# Patient Record
Sex: FEMALE | Race: WHITE | NOT HISPANIC OR LATINO | Employment: OTHER
[De-identification: names, ages, dates, MRNs, and addresses within clinical notes are randomized per-mention and may not be internally consistent; named-entity substitution may affect disease eponyms.]

---

## 2017-07-15 ENCOUNTER — HEALTH MAINTENANCE LETTER (OUTPATIENT)
Age: 62
End: 2017-07-15

## 2019-11-03 ENCOUNTER — HEALTH MAINTENANCE LETTER (OUTPATIENT)
Age: 64
End: 2019-11-03

## 2020-11-16 ENCOUNTER — HEALTH MAINTENANCE LETTER (OUTPATIENT)
Age: 65
End: 2020-11-16

## 2020-12-22 DIAGNOSIS — G47.00 INSOMNIA, UNSPECIFIED: ICD-10-CM

## 2020-12-22 DIAGNOSIS — G43.709 CHRONIC MIGRAINE WITHOUT AURA WITHOUT STATUS MIGRAINOSUS, NOT INTRACTABLE: Primary | ICD-10-CM

## 2020-12-22 RX ORDER — AMITRIPTYLINE HYDROCHLORIDE 10 MG/1
TABLET ORAL
Qty: 270 TABLET | Refills: 0 | Status: CANCELLED | OUTPATIENT
Start: 2020-12-22

## 2020-12-22 RX ORDER — AMITRIPTYLINE HYDROCHLORIDE 10 MG/1
30 TABLET ORAL AT BEDTIME
Qty: 270 TABLET | Refills: 0 | Status: SHIPPED | OUTPATIENT
Start: 2020-12-22 | End: 2021-02-01

## 2020-12-22 NOTE — TELEPHONE ENCOUNTER
Refill request for amitriptyline.  Pt has upcoming appt on 2/1/21.  Medication T'd for review and signature  Montserrat Vaughn LPN on 12/22/2020 at 9:19 AM

## 2021-02-01 ENCOUNTER — VIRTUAL VISIT (OUTPATIENT)
Dept: NEUROLOGY | Facility: CLINIC | Age: 66
End: 2021-02-01
Payer: COMMERCIAL

## 2021-02-01 VITALS — HEIGHT: 64 IN | BODY MASS INDEX: 33.8 KG/M2 | WEIGHT: 198 LBS

## 2021-02-01 DIAGNOSIS — G43.709 CHRONIC MIGRAINE WITHOUT AURA WITHOUT STATUS MIGRAINOSUS, NOT INTRACTABLE: ICD-10-CM

## 2021-02-01 PROCEDURE — 99213 OFFICE O/P EST LOW 20 MIN: CPT | Mod: GT | Performed by: PSYCHIATRY & NEUROLOGY

## 2021-02-01 RX ORDER — AMITRIPTYLINE HYDROCHLORIDE 10 MG/1
30 TABLET ORAL AT BEDTIME
Qty: 270 TABLET | Refills: 3 | Status: SHIPPED | OUTPATIENT
Start: 2021-02-01 | End: 2022-03-17

## 2021-02-01 ASSESSMENT — MIFFLIN-ST. JEOR: SCORE: 1438.12

## 2021-02-01 NOTE — PROGRESS NOTES
"Video follow-up visit requested by patient  Video follow-up visit due to the COVID-19 pandemic  Patient identified  .Patient is being evaluated via a billable video visit. The patient has been notified of following:     \"This video visit will be conducted via a call between you and your physician/provider. We have found that certain health care needs can be provided without the need for an in-person physical exam. This service lets us provide the care you need with a video conversation. If a prescription is necessary we can send it directly to your pharmacy. If lab work is needed we can place an order for that and you can then stop by our lab to have the test done at a later time.    If during the course of the call the physician/provider feels a video visit is not appropriate, you will not be charged for this service.    Physician has received verbal consent for a Video Visit from the patient? YES    Patient would like the video invitation sent by: Email [employer]    Video Visit Details    Type of service: Video Visit    Video Start Time: 12:15 PM    Video End Time (time video stopped): 12:29 PM    Originating Location (pt. Location): Patient's Home    Distant Location (provider location): [facility] [place]     Mode of Communication: Video Conference via [facility]            Chief Complaint   Follow up migraines have been controlled     HPI  Seen 12/4/2019  Video visit 2/1/2021    65-year-old follow-up of her migraine headaches    Primary MD increase the atenolol to 75 mg once per day due to hypertension after our last visit about a year ago  Primary MD is failing the atenolol for the blood pressure    Followed for migraine since 1994 some menstrually induced migraines in the past has had a hysterectomy       Headache control is good  Headaches less than 3 to 4/month  Usually triggered by sleep cycle being off or eating chocolate  Headaches are more in the mild to moderate category than the " severe  We use Tylenol or ibuprofen ice pack to break the headache  Not using Imitrex anymore    Since last seen  No hospitalizations   No surgeries  No major illnesses    Patient is currently retired  Staying busy with home projects          Past headache control  Frequency of headaches couple per month  Duration a day at a time  No photophobia, nausea, rare vomiting  No visual changes  Not using the Imitrex anymore  Uses either ibuprofen or Tylenol but not excessive ibuprofen        She is followed for:    1. Migrainous type headaches followed here since 1994.  2. Menstrually induced migraines in the past, but has had a hysterectomy.    Family history   Mother had a cerebral aneurysm and migraines        Past workup:    1. Normal angiogram in 2006.  2. Triggers for headaches include chocolate and sleep deprivation.  3. Currently on atenolol and amitriptyline as preventative medications.  4. Other triggers can include sleep cycle, missing her caffeine intake.    Discussed good headache hygiene.    Patient in the past used Imitrex as her abortive agent,   Now uses Tylenol alternating with ibuprofen and ice pack    Discussed can use a half of a Unisom to get herself into deeper sleep if she is having a significant breakthrough or is traveling through time zones, rediscussed today.    Patient states that:    1. She has not used any Imitrex over the last year.  2. She is watching her triggers and that has helped.  3. Frequency of headaches is one every seven days, treated with ibuprofen or Tylenol.  4. Has one coffee and one diet pop per day for her caffeine intake.            Exam    Review of systems  Headaches as above 3 to 4/month mild to moderate controlled    No diplopia dysarthria dysphagia  No focal weakness numbness or tingling  No significant vertigo or ataxia  No tremors    No chest pain shortness of breath nausea vomiting diarrhea fever chills    Exam  Alert oriented x3  Neck supple  HEENT normal except  small birthmark on her left sclera followed by ophthalmology  Moving air well  No edema the feet    Neurologic exam  Alert oriented x3  No aphasia  No neglect  Normal memory recall    Cranials 2 through 12  No hemiplegia  No nystagmus  Visual fields intact  Face symmetrical  Tongue twisters good    Upper extremities  No drift no tremor normal finger-nose    Lower semistrength normal    Gait normal          Assessment/Plan     1.  Migraine (G43.709)     amitriptyline 10 mg tablet 3 at nighttime    Atenolol 75 mg daily filled by primary      If still having trouble with blood pressure could switch to either metoprolol or a lisinopril/hydrochlorothiazide combination     Discussed the difference between abortive and preventive medications    discussed the side effects of amitriptyline and her age group    Follow-up at least on a yearly basis sooner if there is problems        Currently, she is doing the followin. Amitriptyline 10 mg tablet, three tablets at nighttime.  2. Atenolol 75 mg once per day.      Total care time today 22 minutes

## 2021-02-01 NOTE — LETTER
"    2/1/2021         RE: Mónica Noguera  [address]  [place] [address]        Dear Colleague,    Thank you for referring your patient, Mónica Noguera, to the [facility] [place]. Please see a copy of my visit note below.    Video follow-up visit requested by patient  Video follow-up visit due to the COVID-19 pandemic  Patient identified  .Patient is being evaluated via a billable video visit. The patient has been notified of following:     \"This video visit will be conducted via a call between you and your physician/provider. We have found that certain health care needs can be provided without the need for an in-person physical exam. This service lets us provide the care you need with a video conversation. If a prescription is necessary we can send it directly to your pharmacy. If lab work is needed we can place an order for that and you can then stop by our lab to have the test done at a later time.    If during the course of the call the physician/provider feels a video visit is not appropriate, you will not be charged for this service.    Physician has received verbal consent for a Video Visit from the patient? YES    Patient would like the video invitation sent by: [email or web address]    Video Visit Details    Type of service: Video Visit    Video Start Time: 12:15 PM    Video End Time (time video stopped): 12:29 PM    Originating Location (pt. Location): Patient's Home    Distant Location (provider location): [facility] [place]     Mode of Communication: Video Conference via [facility]            Chief Complaint   Follow up migraines have been controlled     HPI  Seen 12/4/2019  Video visit 2/1/2021    65-year-old follow-up of her migraine headaches    Primary MD increase the atenolol to 75 mg once per day due to hypertension after our last visit about a year ago  Primary MD is failing the atenolol for the blood pressure    Followed for migraine since 1994 " some menstrually induced migraines in the past has had a hysterectomy       Headache control is good  Headaches less than 3 to 4/month  Usually triggered by sleep cycle being off or eating chocolate  Headaches are more in the mild to moderate category than the severe  We use Tylenol or ibuprofen ice pack to break the headache  Not using Imitrex anymore    Since last seen  No hospitalizations   No surgeries  No major illnesses    Patient is currently retired  Staying busy with home projects          Past headache control  Frequency of headaches couple per month  Duration a day at a time  No photophobia, nausea, rare vomiting  No visual changes  Not using the Imitrex anymore  Uses either ibuprofen or Tylenol but not excessive ibuprofen        She is followed for:    1. Migrainous type headaches followed here since 1994.  2. Menstrually induced migraines in the past, but has had a hysterectomy.    Family history   Mother had a cerebral aneurysm and migraines        Past workup:    1. Normal angiogram in 2006.  2. Triggers for headaches include chocolate and sleep deprivation.  3. Currently on atenolol and amitriptyline as preventative medications.  4. Other triggers can include sleep cycle, missing her caffeine intake.    Discussed good headache hygiene.    Patient in the past used Imitrex as her abortive agent,   Now uses Tylenol alternating with ibuprofen and ice pack    Discussed can use a half of a Unisom to get herself into deeper sleep if she is having a significant breakthrough or is traveling through time zones, rediscussed today.    Patient states that:    1. She has not used any Imitrex over the last year.  2. She is watching her triggers and that has helped.  3. Frequency of headaches is one every seven days, treated with ibuprofen or Tylenol.  4. Has one coffee and one diet pop per day for her caffeine intake.            Exam    Review of systems  Headaches as above 3 to 4/month mild to moderate  controlled    No diplopia dysarthria dysphagia  No focal weakness numbness or tingling  No significant vertigo or ataxia  No tremors    No chest pain shortness of breath nausea vomiting diarrhea fever chills    Exam  Alert oriented x3  Neck supple  HEENT normal except small birthmark on her left sclera followed by ophthalmology  Moving air well  No edema the feet    Neurologic exam  Alert oriented x3  No aphasia  No neglect  Normal memory recall    Cranials 2 through 12  No hemiplegia  No nystagmus  Visual fields intact  Face symmetrical  Tongue twisters good    Upper extremities  No drift no tremor normal finger-nose    Lower semistrength normal    Gait normal          Assessment/Plan     1.  Migraine (G43.709)     amitriptyline 10 mg tablet 3 at nighttime    Atenolol 75 mg daily filled by primary      If still having trouble with blood pressure could switch to either metoprolol or a lisinopril/hydrochlorothiazide combination     Discussed the difference between abortive and preventive medications    discussed the side effects of amitriptyline and her age group    Follow-up at least on a yearly basis sooner if there is problems        Currently, she is doing the followin. Amitriptyline 10 mg tablet, three tablets at nighttime.  2. Atenolol 75 mg once per day.      Total care time today 22 minutes          Again, thank you for allowing me to participate in the care of your patient.        Sincerely,        Brant Juares MD

## 2021-02-01 NOTE — NURSING NOTE
Chief Complaint   Patient presents with     Annual Visit     For migraines. Pt states she is doing really well. States having less than a handful of migraines per month.     Video Visit     [employer]. If difficulties call [phone].     Montserrat Vaughn LPN on 2/1/2021 at 12:00 PM

## 2021-03-29 ENCOUNTER — TELEPHONE (OUTPATIENT)
Dept: NEUROLOGY | Facility: CLINIC | Age: 66
End: 2021-03-29

## 2021-03-29 NOTE — TELEPHONE ENCOUNTER
Pt called requesting Amitriptyline refill be sent to [facility] in [place]. [phone]

## 2021-03-29 NOTE — TELEPHONE ENCOUNTER
Rx was sent on 2/1/21, quantity 270, with 3 refills.  Spoke to pharmacy, they did receive that prescription and will refill it for pt.  Pt notified refill will be ready at the pharmacy.    Montserrat Vaughn LPN on 3/29/2021 at 3:35 PM

## 2021-06-24 ENCOUNTER — RECORDS - HEALTHEAST (OUTPATIENT)
Dept: ADMINISTRATIVE | Facility: CLINIC | Age: 66
End: 2021-06-24

## 2021-09-18 ENCOUNTER — HEALTH MAINTENANCE LETTER (OUTPATIENT)
Age: 66
End: 2021-09-18

## 2022-01-08 ENCOUNTER — HEALTH MAINTENANCE LETTER (OUTPATIENT)
Age: 67
End: 2022-01-08

## 2022-03-17 ENCOUNTER — TELEPHONE (OUTPATIENT)
Dept: NEUROLOGY | Facility: CLINIC | Age: 67
End: 2022-03-17
Payer: COMMERCIAL

## 2022-03-17 DIAGNOSIS — G43.709 CHRONIC MIGRAINE WITHOUT AURA WITHOUT STATUS MIGRAINOSUS, NOT INTRACTABLE: ICD-10-CM

## 2022-03-17 RX ORDER — AMITRIPTYLINE HYDROCHLORIDE 10 MG/1
30 TABLET ORAL AT BEDTIME
Qty: 90 TABLET | Refills: 0 | Status: SHIPPED | OUTPATIENT
Start: 2022-03-17 | End: 2022-05-18

## 2022-03-17 NOTE — LETTER
3/17/2022        RE: Mónica TRINH Poornima  [address] [place] [address]          Dear Ms. Noguera,       We recently provided you with medication refills.  Many medications require routine follow-up with your doctor.    Your prescription(s) have been refilled for 30 days so you may have time for the above noted follow-up. Please call to schedule soon so we can assure you have an appointment before your next refills are needed. If you have already made a follow up appointment, please disregard this letter.         Sincerely,      [facility] [place]     (Formerly known as [facility])

## 2022-03-17 NOTE — TELEPHONE ENCOUNTER
Refill request for Amitriptyline. Pt last seen 2/1/22 and is in need of follow up. Will send in 30 day supply with zero refills. Will also send letter regarding need for follow up.     Tara Parker RN on 3/17/2022 at 2:42 PM

## 2022-03-17 NOTE — TELEPHONE ENCOUNTER
Carlos Nieto sent a refill request for Amitriptyline 10mg 3 tabs at bedtime #270.  I have lm for pt to call and make a appt.

## 2022-04-30 ENCOUNTER — HEALTH MAINTENANCE LETTER (OUTPATIENT)
Age: 67
End: 2022-04-30

## 2022-05-18 DIAGNOSIS — G43.709 CHRONIC MIGRAINE WITHOUT AURA WITHOUT STATUS MIGRAINOSUS, NOT INTRACTABLE: ICD-10-CM

## 2022-05-18 RX ORDER — AMITRIPTYLINE HYDROCHLORIDE 10 MG/1
30 TABLET ORAL AT BEDTIME
Qty: 270 TABLET | Refills: 2 | Status: SHIPPED | OUTPATIENT
Start: 2022-05-18 | End: 2022-08-16

## 2022-05-18 NOTE — TELEPHONE ENCOUNTER
Refill request for Amitripyline. Pt last seen 2/1/21 and has follow up scheduled for 12/2/22. Will send in refills.     Tara Parker RN on 5/18/2022 at 1:11 PM

## 2022-08-16 ENCOUNTER — VIRTUAL VISIT (OUTPATIENT)
Dept: NEUROLOGY | Facility: CLINIC | Age: 67
End: 2022-08-16
Payer: COMMERCIAL

## 2022-08-16 VITALS — BODY MASS INDEX: 33.8 KG/M2 | WEIGHT: 198 LBS | HEIGHT: 64 IN

## 2022-08-16 DIAGNOSIS — G43.709 CHRONIC MIGRAINE WITHOUT AURA WITHOUT STATUS MIGRAINOSUS, NOT INTRACTABLE: ICD-10-CM

## 2022-08-16 PROCEDURE — 99215 OFFICE O/P EST HI 40 MIN: CPT | Mod: 95

## 2022-08-16 RX ORDER — AMITRIPTYLINE HYDROCHLORIDE 10 MG/1
30 TABLET ORAL AT BEDTIME
Qty: 270 TABLET | Refills: 3 | Status: SHIPPED | OUTPATIENT
Start: 2022-08-16 | End: 2023-10-17

## 2022-08-16 NOTE — PATIENT INSTRUCTIONS
Plan:  --- Continue preventive therapy: amitriptyline (ELAVIL) 10 MG tablet - Take 3 tablets (30 mg) by mouth at Bedtime - refilled  --- Continue abortive therapy: Tylenol or Advil  --- Contiue nonpharmacological interventions: cold, massage, counterpressure and/or rest  --- Practice good headache hygiene: Avoid known triggers, get adequate sleep, manage stress levels, stay hydrated and eat nutritious meals  --- Plan on follow up in the Neurology Clinic in 12 months.  --- Please feel free to reach out if you have any further questions or concerns.  --- Seek immediate medical attention if an emergency arises or if your health becomes progressively worse.     It was a pleasure meeting you today!

## 2022-08-16 NOTE — NURSING NOTE
Chief Complaint   Patient presents with     Migraine     She has a couple of migraines per month.     Video Visit     BURAK Vaughn LPN on 8/16/2022 at 8:50 AM

## 2022-08-16 NOTE — LETTER
8/16/2022         RE: Mónica Noguera  [address] [place] [facility] [address]        Dear Colleague,    Thank you for referring your patient, Mónica Noguera, to the [facility] [place]. Please see a copy of my visit note below.    Mónica is a 67 year old who is being evaluated via a billable video visit.      Video-Visit Details    Video Start Time: 9:01 AM    Type of service:  Video Visit    Video End Time:9:10 AM    Originating Location (pt. Location): Home    Distant Location (provider location):  [facility]UROLOG[facility]     Platform used for Video Visit: [facility]    ESTABLISHED PATIENT NEUROLOGY NOTE    DATE OF VISIT: 8/16/2022  MRN: [id number]  PATIENT NAME: Mónica Noguera  YOB: 1955    Chief Complaint   Patient presents with     Migraine     She has a couple of migraines per month.     Video Visit     MYCHART     SUBJECTIVE:                                                      HISTORY OF PRESENT ILLNESS:  Mónica is here for follow up regarding Migrainous type headaches     Mónica Noguera is a 67-year-old female with a history hypertension and migraines who has been followed since 1994. She was last followed by Dr. Juares for migraine management in the clinic. Per chart review, she has a history of some menstrually induced migraines and has had a hysterectomy.  Past headache were described as a couple per month for the duration of one day.  No photophobia, nausea and rare vomiting.  No visual changes.  Triggers include chocolate and sleep deprivation.  Patient had adequate headache control on amitriptyline as a preventative.  She is also on atenolol for hypertension.  She has used Imitrex in the past as her abortive agent and switched over to Tylenol alternating with ibuprofen and ice pack.     Video Visit Today 08/16/22:  Mónica is a pleasant 67-year-old woman who presented today via a video visit for her annual migraine follow-up.  She describes her  headaches as throbbing pain in the back of her head.  Post hysterectomy and with her current regimen of amitriptyline and atenolol she no longer experiences photophobia, phonophobia or nausea.  She states that she has not needed to lay down to rest for a headache in a very long time.  Triggers include certain foods and disruptions in her sleep cycle.  She reports a couple of manageable migraines per month that are alleviated with Tylenol or Advil.  Other methods to relieve pain include pressure to her temple or neck and ice.  She reports that for the most part she is headache free each month. She denies any visual or speech changes, weakness, neck rigidity, or changes in the pattern of her headache.    She states that she was started on atenolol by neurology years ago for migraine management, and once she was diagnosed with hypertension her PCP kept her on this medication and adjusted the dose.  She follows up with her primary a few times a year to monitor her blood pressure.  Currently well controlled on 75 mg of atenolol daily.     Current Medications:   ATENOLOL 50 MG OR TABS, 1 TABLET DAILY  IMITREX 50 MG OR TABS, prn  AMITRIPTYLINE HCL 10 MG OR TABS, 3 tabs qHS  PREVACID 30 MG OR CPDR, ONE DAILY (Patient not taking: No sig reported)    No current facility-administered medications on file prior to visit.    Past Medical History:   Patient  has a past medical history of Migraine, unspecified, without mention of intractable migraine without mention of status migrainosus and Reflux esophagitis.    Surgical History:  She  has a past surgical history that includes NONSPECIFIC PROCEDURE (1989); VAG HYST,RMV TUBE/OVARY; IR Carotid Angiogram (2/22/2006); IR Miscellaneous Procedure (2/22/2006); IR Carotid Angiogram (2/22/2006); and IR Miscellaneous Procedure (2/22/2006).    Family and Social History:  Reviewed, and she  reports that she has quit smoking. Her smoking use included cigarettes. She has a 20.00 pack-year  smoking history. She has never used smokeless tobacco. She reports that she does not drink alcohol and does not use drugs.    Reviewed, and family history includes Breast Cancer in her mother; Cerebrovascular Disease in her mother; Coronary Artery Disease in her father; Diabetes in her father; Hypertension in her sister; Ovarian Cancer in her mother.      RECENT DIAGNOSTIC STUDIES:   Per chart review,   1. Normal angiogram in 2006.     REVIEW OF SYSTEMS:                                                      10-point review of systems is negative except as mentioned above in HPI.    EXAM:                                                      Physical Exam: Limited exam- Video visit  Vitals: No vitals were obtained today due to virtual visit  GENERAL: NAD. Well groomed  HEENT: NC/AT.  PULM: Non-labored breathing.   Neurologic:  MENTAL STATUS: Alert, attentive. Speech is fluent. Normal comprehension. Normal concentration. Adequate fund of knowledge.   CRANIAL NERVES: Cranial nerves grossly intact: Facial movement normal. Hearing intact to conversation.   SKIN: Visible skin clear. No significant rash, abnormal pigmentation or lesions.  STATION AND GAIT: Good postural reflexes. Casual gait normal     ASSESSMENT and PLAN:                                                      Assessment:    ICD-10-CM    1. Chronic migraine without aura without status migrainosus, not intractable  G43.709 amitriptyline (ELAVIL) 10 MG tablet     Mónica Noguera is a pleasant 67-year-old female with a history hypertension and migraines. She has had adequate headache control on her current preventive therapy of amitriptyline 30 mg at HS and abortive therapy of tylenol or advil.  She states that she has a couple of migraines per month that are manageable with OTC meds, otherwise she remains headache free.  I will plan to keep her current regimen.  I refilled her amitriptyline and plan to see her back next year.     We discussed the interventions and  Mónica understands and agrees with this plan.     Plan:  --- Continue preventive therapy: amitriptyline (ELAVIL) 10 MG tablet - Take 3 tablets (30 mg) by mouth at Bedtime - refilled  --- Continue abortive therapy: Tylenol or Advil  --- Contiue nonpharmacological interventions: cold, massage, counterpressure and/or rest  --- Practice good headache hygiene: Avoid known triggers, get adequate sleep, manage stress levels, stay hydrated and eat nutritious meals  --- Plan on follow up in the Neurology Clinic in 12 months.  --- Please feel free to reach out if you have any further questions or concerns.  --- Seek immediate medical attention if an emergency arises or if your health becomes progressively worse.     It was a pleasure meeting you today!     Total Time: Total time spent for face to face visit, reviewing labs/imaging studies, counseling and coordination of care was: 45 Minutes spent on the date of the encounter doing chart review, review of test results, patient visit and documentation       This note was dictated using voice recognition software.  Any grammatical or context distortions are unintentional and inherent to the software.    Cecy Jenkins, RAHUL, APRN, CNP  [facility]             Again, thank you for allowing me to participate in the care of your patient.        Sincerely,        TAVON Vargas CNP

## 2022-08-16 NOTE — PROGRESS NOTES
Mónica is a 67 year old who is being evaluated via a billable video visit.      Video-Visit Details    Video Start Time: 9:01 AM    Type of service:  Video Visit    Video End Time:9:10 AM    Originating Location (pt. Location): Home    Distant Location (provider location):  [facility] [place]     Platform used for Video Visit: [facility] PATIENT NEUROLOGY NOTE    DATE OF VISIT: 8/16/2022  MRN: [id number]  PATIENT NAME: Mónica Noguera  YOB: 1955    Chief Complaint   Patient presents with     Migraine     She has a couple of migraines per month.     Video Visit     [facility]     SUBJECTIVE:                                                      HISTORY OF PRESENT ILLNESS:  Mónica is here for follow up regarding Migrainous type headaches     Mónica Noguera is a 67-year-old female with a history hypertension and migraines who has been followed since 1994. She was last followed by Dr. Juares for migraine management in the clinic. Per chart review, she has a history of some menstrually induced migraines and has had a hysterectomy.  Past headache were described as a couple per month for the duration of one day.  No photophobia, nausea and rare vomiting.  No visual changes.  Triggers include chocolate and sleep deprivation.  Patient had adequate headache control on amitriptyline as a preventative.  She is also on atenolol for hypertension.  She has used Imitrex in the past as her abortive agent and switched over to Tylenol alternating with ibuprofen and ice pack.     Video Visit Today 08/16/22:  Mónica is a pleasant 67-year-old woman who presented today via a video visit for her annual migraine follow-up.  She describes her headaches as throbbing pain in the back of her head.  Post hysterectomy and with her current regimen of amitriptyline and atenolol she no longer experiences photophobia, phonophobia or nausea.  She states that she has not needed to lay down to rest for a  headache in a very long time.  Triggers include certain foods and disruptions in her sleep cycle.  She reports a couple of manageable migraines per month that are alleviated with Tylenol or Advil.  Other methods to relieve pain include pressure to her temple or neck and ice.  She reports that for the most part she is headache free each month. She denies any visual or speech changes, weakness, neck rigidity, or changes in the pattern of her headache.    She states that she was started on atenolol by neurology years ago for migraine management, and once she was diagnosed with hypertension her PCP kept her on this medication and adjusted the dose.  She follows up with her primary a few times a year to monitor her blood pressure.  Currently well controlled on 75 mg of atenolol daily.     Current Medications:   ATENOLOL 50 MG OR TABS, 1 TABLET DAILY  IMITREX 50 MG OR TABS, prn  AMITRIPTYLINE HCL 10 MG OR TABS, 3 tabs qHS  PREVACID 30 MG OR CPDR, ONE DAILY (Patient not taking: No sig reported)    No current facility-administered medications on file prior to visit.    Past Medical History:   Patient  has a past medical history of Migraine, unspecified, without mention of intractable migraine without mention of status migrainosus and Reflux esophagitis.    Surgical History:  She  has a past surgical history that includes NONSPECIFIC PROCEDURE (1989); VAG HYST,RMV TUBE/OVARY; IR Carotid Angiogram (2/22/2006); IR Miscellaneous Procedure (2/22/2006); IR Carotid Angiogram (2/22/2006); and IR Miscellaneous Procedure (2/22/2006).    Family and Social History:  Reviewed, and she  reports that she has quit smoking. Her smoking use included cigarettes. She has a 20.00 pack-year smoking history. She has never used smokeless tobacco. She reports that she does not drink alcohol and does not use drugs.    Reviewed, and family history includes Breast Cancer in her mother; Cerebrovascular Disease in her mother; Coronary Artery Disease in  her father; Diabetes in her father; Hypertension in her sister; Ovarian Cancer in her mother.      RECENT DIAGNOSTIC STUDIES:   Per chart review,   1. Normal angiogram in 2006.     REVIEW OF SYSTEMS:                                                      10-point review of systems is negative except as mentioned above in HPI.    EXAM:                                                      Physical Exam: Limited exam- Video visit  Vitals: No vitals were obtained today due to virtual visit  GENERAL: NAD. Well groomed  HEENT: NC/AT.  PULM: Non-labored breathing.   Neurologic:  MENTAL STATUS: Alert, attentive. Speech is fluent. Normal comprehension. Normal concentration. Adequate fund of knowledge.   CRANIAL NERVES: Cranial nerves grossly intact: Facial movement normal. Hearing intact to conversation.   SKIN: Visible skin clear. No significant rash, abnormal pigmentation or lesions.  STATION AND GAIT: Good postural reflexes. Casual gait normal     ASSESSMENT and PLAN:                                                      Assessment:    ICD-10-CM    1. Chronic migraine without aura without status migrainosus, not intractable  G43.709 amitriptyline (ELAVIL) 10 MG tablet     Mónica Noguera is a pleasant 67-year-old female with a history hypertension and migraines. She has had adequate headache control on her current preventive therapy of amitriptyline 30 mg at HS and abortive therapy of tylenol or advil.  She states that she has a couple of migraines per month that are manageable with OTC meds, otherwise she remains headache free.  I will plan to keep her current regimen.  I refilled her amitriptyline and plan to see her back next year.     We discussed the interventions and Mónica understands and agrees with this plan.     Plan:  --- Continue preventive therapy: amitriptyline (ELAVIL) 10 MG tablet - Take 3 tablets (30 mg) by mouth at Bedtime - refilled  --- Continue abortive therapy: Tylenol or Advil  --- Contiue  nonpharmacological interventions: cold, massage, counterpressure and/or rest  --- Practice good headache hygiene: Avoid known triggers, get adequate sleep, manage stress levels, stay hydrated and eat nutritious meals  --- Plan on follow up in the Neurology Clinic in 12 months.  --- Please feel free to reach out if you have any further questions or concerns.  --- Seek immediate medical attention if an emergency arises or if your health becomes progressively worse.     It was a pleasure meeting you today!     Total Time: Total time spent for face to face visit, reviewing labs/imaging studies, counseling and coordination of care was: 45 Minutes spent on the date of the encounter doing chart review, review of test results, patient visit and documentation       This note was dictated using voice recognition software.  Any grammatical or context distortions are unintentional and inherent to the software.    Cecy Jenkins, DNP, APRN, CNP  [facility]

## 2022-11-19 ENCOUNTER — HEALTH MAINTENANCE LETTER (OUTPATIENT)
Age: 67
End: 2022-11-19

## 2023-02-08 DIAGNOSIS — G43.709 CHRONIC MIGRAINE WITHOUT AURA WITHOUT STATUS MIGRAINOSUS, NOT INTRACTABLE: ICD-10-CM

## 2023-02-08 RX ORDER — AMITRIPTYLINE HYDROCHLORIDE 10 MG/1
TABLET ORAL
Qty: 270 TABLET | Refills: 2 | OUTPATIENT
Start: 2023-02-08

## 2023-04-15 ENCOUNTER — HEALTH MAINTENANCE LETTER (OUTPATIENT)
Age: 68
End: 2023-04-15

## 2023-06-01 ENCOUNTER — HEALTH MAINTENANCE LETTER (OUTPATIENT)
Age: 68
End: 2023-06-01

## 2023-09-25 DIAGNOSIS — G47.00 INSOMNIA, UNSPECIFIED: ICD-10-CM

## 2023-09-25 RX ORDER — AMITRIPTYLINE HYDROCHLORIDE 10 MG/1
TABLET ORAL
Qty: 90 TABLET | Refills: 0 | Status: SHIPPED | OUTPATIENT
Start: 2023-09-25 | End: 2023-10-17

## 2023-09-25 NOTE — TELEPHONE ENCOUNTER
Refill request for: amitriptyline (ELAVIL) 10 MG tablet    Directions: Take 3 tablets (30 mg) by mouth At Bedtime     LOV: 8/16/22  NOV: 12/11/23    90 day supply with 0 refills Medication T'd for review and signature  Shefali Amanda CMA on 9/25/2023 at 3:38 PM  [facility]

## 2023-10-17 ENCOUNTER — TELEPHONE (OUTPATIENT)
Dept: NEUROLOGY | Facility: CLINIC | Age: 68
End: 2023-10-17
Payer: COMMERCIAL

## 2023-10-17 DIAGNOSIS — G47.00 INSOMNIA, UNSPECIFIED: ICD-10-CM

## 2023-10-17 RX ORDER — AMITRIPTYLINE HYDROCHLORIDE 10 MG/1
TABLET ORAL
Qty: 90 TABLET | Refills: 1 | Status: SHIPPED | OUTPATIENT
Start: 2023-10-17 | End: 2023-12-26

## 2023-10-17 NOTE — TELEPHONE ENCOUNTER
Sent Rx for additional two months (patient was only given one month supply at last refill of Rx).    This will provide enough until appointment to be seen in December.    Signed Prescriptions:                        Disp   Refills    amitriptyline (ELAVIL) 10 MG tablet        90 tab*1        Sig: TAKE 3 TABLETS AT BEDTIME  Authorizing Provider: CARLENE FUNG  Ordering User: AJ CLEMENTS    Sent to [facility] in [place] as requested.  Patient called and informed.    Aj Clements, RN, BSN  [facility] Neurology

## 2023-10-17 NOTE — TELEPHONE ENCOUNTER
[facility] Call Center    Phone Message    May a detailed message be left on voicemail: yes     Reason for Call: Medication Question or concern regarding medication   Prescription Clarification  Name of Medication: amitriptyline (ELAVIL) 10 MG tablet [1127] (Order [id number])  Prescribing Provider: Cecy Jenkins APRN CNP [facility] NEUROLOGY     Pharmacy: [facility] Address: [address] [place] [address] , [place] [address]   What on the order needs clarification? The Patient says she was given a 90 day supply and her appointment isn't until dec 11th, and so she would need another refill.    Please call Mónica @ [phone] to discuss further.      Action Taken: Message routed to:  Other: [facility] Neurology    Travel Screening: Not Applicable

## 2023-12-26 DIAGNOSIS — G47.00 INSOMNIA, UNSPECIFIED: ICD-10-CM

## 2023-12-26 RX ORDER — AMITRIPTYLINE HYDROCHLORIDE 10 MG/1
TABLET ORAL
Qty: 90 TABLET | Refills: 0 | Status: SHIPPED | OUTPATIENT
Start: 2023-12-26

## 2023-12-26 NOTE — TELEPHONE ENCOUNTER
Refill request for: amitriptyline 10mg   Directions: TAKE 3 TABLETS AT BEDTIME     LOV: 08/16/22  NOV: No future appt scheduled. Endovention message has already been sent to pt to remind to schedule.    30 day supply with 0 refills Medication T'd for review and signature    Montserrat Vaughn LPN on 12/26/2023 at 10:31 AM

## 2023-12-26 NOTE — TELEPHONE ENCOUNTER
Emilia sent a refill request for Amitriptyline 10 mg 3 tabs q hs. # 90.  I did send pt a message to schedule an appt.  She has cancelled that last few times..

## 2024-06-16 ENCOUNTER — HEALTH MAINTENANCE LETTER (OUTPATIENT)
Age: 69
End: 2024-06-16

## 2025-04-19 ENCOUNTER — HEALTH MAINTENANCE LETTER (OUTPATIENT)
Age: 70
End: 2025-04-19

## 2025-06-21 ENCOUNTER — HEALTH MAINTENANCE LETTER (OUTPATIENT)
Age: 70
End: 2025-06-21